# Patient Record
Sex: MALE | Race: WHITE | NOT HISPANIC OR LATINO | URBAN - METROPOLITAN AREA
[De-identification: names, ages, dates, MRNs, and addresses within clinical notes are randomized per-mention and may not be internally consistent; named-entity substitution may affect disease eponyms.]

---

## 2018-06-14 ENCOUNTER — OUTPATIENT (OUTPATIENT)
Dept: OUTPATIENT SERVICES | Facility: HOSPITAL | Age: 63
LOS: 1 days | Discharge: HOME | End: 2018-06-14

## 2018-06-14 DIAGNOSIS — E55.9 VITAMIN D DEFICIENCY, UNSPECIFIED: ICD-10-CM

## 2018-06-14 DIAGNOSIS — Z00.00 ENCOUNTER FOR GENERAL ADULT MEDICAL EXAMINATION WITHOUT ABNORMAL FINDINGS: ICD-10-CM

## 2018-06-21 PROBLEM — Z00.00 ENCOUNTER FOR PREVENTIVE HEALTH EXAMINATION: Status: ACTIVE | Noted: 2018-06-21

## 2018-06-27 ENCOUNTER — LABORATORY RESULT (OUTPATIENT)
Age: 63
End: 2018-06-27

## 2018-06-27 ENCOUNTER — OUTPATIENT (OUTPATIENT)
Dept: OUTPATIENT SERVICES | Facility: HOSPITAL | Age: 63
LOS: 1 days | Discharge: HOME | End: 2018-06-27

## 2018-06-27 ENCOUNTER — APPOINTMENT (OUTPATIENT)
Dept: UROLOGY | Facility: CLINIC | Age: 63
End: 2018-06-27
Payer: COMMERCIAL

## 2018-06-27 VITALS
HEIGHT: 70.5 IN | BODY MASS INDEX: 23.36 KG/M2 | DIASTOLIC BLOOD PRESSURE: 76 MMHG | WEIGHT: 165 LBS | SYSTOLIC BLOOD PRESSURE: 116 MMHG | HEART RATE: 75 BPM

## 2018-06-27 DIAGNOSIS — Z87.891 PERSONAL HISTORY OF NICOTINE DEPENDENCE: ICD-10-CM

## 2018-06-27 DIAGNOSIS — Z78.9 OTHER SPECIFIED HEALTH STATUS: ICD-10-CM

## 2018-06-27 DIAGNOSIS — Z82.49 FAMILY HISTORY OF ISCHEMIC HEART DISEASE AND OTHER DISEASES OF THE CIRCULATORY SYSTEM: ICD-10-CM

## 2018-06-27 PROCEDURE — 99203 OFFICE O/P NEW LOW 30 MIN: CPT

## 2018-06-27 PROCEDURE — 87210 SMEAR WET MOUNT SALINE/INK: CPT | Mod: QW

## 2018-06-27 RX ORDER — ASPIRIN ENTERIC COATED TABLETS 81 MG 81 MG/1
81 TABLET, DELAYED RELEASE ORAL
Refills: 0 | Status: ACTIVE | COMMUNITY

## 2018-06-27 NOTE — LETTER BODY
[Dear  ___] : Dear  [unfilled], [Consult Letter:] : I had the pleasure of evaluating your patient, [unfilled]. [Please see my note below.] : Please see my note below. [Consult Closing:] : Thank you very much for allowing me to participate in the care of this patient.  If you have any questions, please do not hesitate to contact me. [FreeTextEntry2] : Manohar Velazquez MD\par [address]\[place] [address]

## 2018-06-27 NOTE — LETTER HEADER
[Angelika Saha MD] : Angelika Saha MD [Director of Urology] : Director of Urology [[address]] : [address] [[address]] : [address] [[place] [address]] : [place] [address] [Tel [phone]] : Tel [phone] [Fax [phone]] : Fax [phone]

## 2018-06-27 NOTE — PHYSICAL EXAM
[General Appearance - Well Developed] : well developed [General Appearance - Well Nourished] : well nourished [Normal Appearance] : normal appearance [Well Groomed] : well groomed [General Appearance - In No Acute Distress] : no acute distress [Heart Rate And Rhythm] : Heart rate and rhythm were normal [Edema] : no peripheral edema [Respiration, Rhythm And Depth] : normal respiratory rhythm and effort [Exaggerated Use Of Accessory Muscles For Inspiration] : no accessory muscle use [Auscultation Breath Sounds / Voice Sounds] : lungs were clear to auscultation bilaterally [Abdomen Soft] : soft [Abdomen Tenderness] : non-tender [Abdomen Hernia] : no hernia was discovered [Costovertebral Angle Tenderness] : no ~M costovertebral angle tenderness [Urethral Meatus] : meatus normal [Penis Abnormality] : normal circumcised penis [Epididymis] : the epididymides were normal [Testes Tenderness] : no tenderness of the testes [Testes Mass (___cm)] : there were no testicular masses [Anus Abnormality] : the anus and perineum were normal [Rectal Exam - Rectum] : digital rectal exam was normal [Prostate Enlargement] : the prostate was not enlarged [Prostate Tenderness] : the prostate was not tender [No Prostate Nodules] : no prostate nodules [Prostate Size ___ gm] : prostate size [unfilled] gm [Normal Station and Gait] : the gait and station were normal for the patient's age [] : no rash [FreeTextEntry1] : DTR's & BC reflexes were intact  [Oriented To Time, Place, And Person] : oriented to person, place, and time [Affect] : the affect was normal [Mood] : the mood was normal [Not Anxious] : not anxious [No Palpable Adenopathy] : no palpable adenopathy

## 2018-06-27 NOTE — HISTORY OF PRESENT ILLNESS
[FreeTextEntry1] : Jose is a pleasant 63-year-old male who in November 2016 was found to have a PSA of six was told to see someone and waited till now. A repeat PSA on June 14, 2018 was 9.1. There was no free PSA drawn. He is now decided to come seek an evaluation.\par \par Associated lower urinary tract symptoms which began about a year ago include incomplete emptying – one\par Frequency – two\par Urgency – two. \par He wakes up once per night and this is irritating giving him a quality-of-life of four. When he voids he is voiding normal amounts per void so this may be behavioral i.e. he’s taking a lot of fluid something we will look into once we see what’s going on with his PSA.\par \par Prior medical conditions are fairly benign, he takes a daily aspirin and as a relatively good health. Other urologic issues his erections are not what they used to be but they suffice for him and his partner and he has no history of stones blood in the urine or any personal family history of urologic cancers.\par  [Urinary Urgency] : urinary urgency [Urinary Frequency] : urinary frequency [Nocturia] : nocturia [None] : None

## 2018-06-27 NOTE — ASSESSMENT
[FreeTextEntry1] : His exam is relatively benign except for some mild penile atrophy. The prostate is on the small side perhaps 15 g with some slight a bogginess to the left side. Prostatic massage yielded mild amounts of expressed prostatic secretions a microscopic exam of which showed about 12 to 13 white cells per high power field which is in the equivocal region.\par \par He will have him wait a few days because I just did prostatic massage and he will get blood drawn after 2 to 3 days of no orgasm. We will get a free PSA and we will also send the expressed prostatic secretions off for culture. Depending on the free PSA and the culture results we may put him on antibiotics, we may recommend an ultrasound and possibly an ultrasound guided biopsy. Depending on the numbers I might also request an MRI first though many the insurance companies are not allowing it current AUA feeling is that an MRI may rule out the need for a biopsy and given the 2 to 4% incidence of sepsis post a biopsy is probably cost-effective but for now hard to get approved\par

## 2018-06-28 DIAGNOSIS — R33.9 RETENTION OF URINE, UNSPECIFIED: ICD-10-CM

## 2018-06-28 DIAGNOSIS — R35.1 NOCTURIA: ICD-10-CM

## 2018-06-28 DIAGNOSIS — R97.20 ELEVATED PROSTATE SPECIFIC ANTIGEN [PSA]: ICD-10-CM

## 2018-06-28 DIAGNOSIS — R35.0 FREQUENCY OF MICTURITION: ICD-10-CM

## 2018-06-29 LAB
APPEARANCE: CLEAR
BILIRUBIN URINE: NEGATIVE
BLOOD URINE: NEGATIVE
COLOR: YELLOW
GLUCOSE QUALITATIVE U: NEGATIVE MG/DL
KETONES URINE: NEGATIVE
LEUKOCYTE ESTERASE URINE: NEGATIVE
NITRITE URINE: NEGATIVE
PH URINE: 6
PROTEIN URINE: NEGATIVE MG/DL
SPECIFIC GRAVITY URINE: 1.01
UROBILINOGEN URINE: 0.2 MG/DL (ref 0.2–?)

## 2018-07-02 ENCOUNTER — LABORATORY RESULT (OUTPATIENT)
Age: 63
End: 2018-07-02

## 2018-07-02 ENCOUNTER — OUTPATIENT (OUTPATIENT)
Dept: OUTPATIENT SERVICES | Facility: HOSPITAL | Age: 63
LOS: 1 days | Discharge: HOME | End: 2018-07-02

## 2018-07-02 DIAGNOSIS — R33.9 RETENTION OF URINE, UNSPECIFIED: ICD-10-CM

## 2018-07-02 DIAGNOSIS — R35.0 FREQUENCY OF MICTURITION: ICD-10-CM

## 2018-07-02 DIAGNOSIS — R97.20 ELEVATED PROSTATE SPECIFIC ANTIGEN [PSA]: ICD-10-CM

## 2018-07-02 DIAGNOSIS — R39.15 URGENCY OF URINATION: ICD-10-CM

## 2018-07-02 LAB
BACTERIA SPEC CULT: ABNORMAL
BACTERIA UR CULT: NORMAL

## 2018-07-03 LAB
PSA FREE FLD-MCNC: 10
PSA FREE SERPL-MCNC: 0.97 NG/ML
PSA SERPL-MCNC: 9.68 NG/ML

## 2018-07-05 ENCOUNTER — APPOINTMENT (OUTPATIENT)
Dept: UROLOGY | Facility: CLINIC | Age: 63
End: 2018-07-05
Payer: COMMERCIAL

## 2018-07-05 VITALS
SYSTOLIC BLOOD PRESSURE: 114 MMHG | HEIGHT: 70 IN | BODY MASS INDEX: 23.62 KG/M2 | WEIGHT: 165 LBS | HEART RATE: 74 BPM | DIASTOLIC BLOOD PRESSURE: 74 MMHG

## 2018-07-05 PROCEDURE — 99214 OFFICE O/P EST MOD 30 MIN: CPT

## 2018-07-05 NOTE — HISTORY OF PRESENT ILLNESS
[FreeTextEntry1] : Jose was seen on June 27. With respect to the urinary issues he was sent for record of his intake and output, would consider a flow study and he sent for urinalysis, culture and cytology. With respect to his PSA elevation prostatic massage as shown expressed prostatic secretions so there was a question of prostatitis. We sent that for culture. We had him wait a few days after that so he could then get a repeat PSA and is here to review the culture reports in the data. [Urinary Urgency] : urinary urgency [Urinary Frequency] : urinary frequency [Nocturia] : nocturia [None] : None

## 2018-07-05 NOTE — LETTER BODY
[Dear  ___] : Dear  [unfilled], [Courtesy Letter:] : I had the pleasure of seeing your patient, [unfilled], in my office today. [Please see my note below.] : Please see my note below. [Sincerely,] : Sincerely, [FreeTextEntry2] : Manohar Velazquez MD\par [address]\[place] [address]\par

## 2018-07-05 NOTE — ASSESSMENT
[FreeTextEntry1] : His record of his intake and output was meticulously done his intake was 52 ounces which is only a liter and a half not enough to explain why he would have trouble with urination. When we looked at his voiding I don’t see an issue here. He went once with an hour and a quarter of an interval the rest of 2 to 4 hours apart and he had no nocturia. However he never record of the volume voided so it’s not really a great study and he will have to repeat it. However, as we will discuss shortly given the findings of prostatitis I’d wait and see how that resolves before worrying about his voiding as very often the prostatitis in and of itself can cause irritation causing increased voiding.\par \par With respect to his PSA the repeat came back at 9.68 with a free PSA of 10%. Theoretically that’s not great however the culture of the Expressed Prostatic Secretions (EPS) came out growing coag negative Staphylococcus. Theoretically the free PSA should be high if the PSA was coming from infection. However, he said this elevation for over two years, by seeing him in the face of a chronic prostatitis has an increased risk of sepsis and if we treat his chronic prostatitis and the PSA comes down to normal levels, something that is unlikely but possible we might get away with a biopsy which in and of itself and healthy patients has a 2 – 3% risk of sepsis. Though we had the potential risk of delay in diagnosis in my opinion the risk-benefit of waiting to see if the PSA stays high and if we do perform a biopsy do it after his prostatitis is treated is worth risk. Please note his urine tests had a UA with a specific gravity of 1.015, pH of six and was negative for infection both on the dip and on the culture as a urine culture was no growth. His cytology had rare atypical cells which for now is something that is not high in the list of angst inducing\par \par As far as his hormone levels they are still pending\par

## 2018-07-05 NOTE — LETTER HEADER
[FreeTextEntry3] : Angelika Saha M.D.\par Director of Urology\par [facility]\par [address] [place] [address], [address]\[place] [address]

## 2018-07-17 ENCOUNTER — TRANSCRIPTION ENCOUNTER (OUTPATIENT)
Age: 63
End: 2018-07-17

## 2018-08-05 ENCOUNTER — LABORATORY RESULT (OUTPATIENT)
Age: 63
End: 2018-08-05

## 2018-08-06 ENCOUNTER — APPOINTMENT (OUTPATIENT)
Dept: UROLOGY | Facility: CLINIC | Age: 63
End: 2018-08-06
Payer: COMMERCIAL

## 2018-08-06 ENCOUNTER — LABORATORY RESULT (OUTPATIENT)
Age: 63
End: 2018-08-06

## 2018-08-06 ENCOUNTER — OUTPATIENT (OUTPATIENT)
Dept: OUTPATIENT SERVICES | Facility: HOSPITAL | Age: 63
LOS: 1 days | Discharge: HOME | End: 2018-08-06

## 2018-08-06 VITALS
HEART RATE: 93 BPM | HEIGHT: 70 IN | SYSTOLIC BLOOD PRESSURE: 122 MMHG | DIASTOLIC BLOOD PRESSURE: 77 MMHG | WEIGHT: 165 LBS | BODY MASS INDEX: 23.62 KG/M2

## 2018-08-06 DIAGNOSIS — R33.9 RETENTION OF URINE, UNSPECIFIED: ICD-10-CM

## 2018-08-06 DIAGNOSIS — Z87.898 PERSONAL HISTORY OF OTHER SPECIFIED CONDITIONS: ICD-10-CM

## 2018-08-06 DIAGNOSIS — Z87.448 PERSONAL HISTORY OF OTHER DISEASES OF URINARY SYSTEM: ICD-10-CM

## 2018-08-06 PROCEDURE — 87210 SMEAR WET MOUNT SALINE/INK: CPT | Mod: QW

## 2018-08-06 PROCEDURE — 99214 OFFICE O/P EST MOD 30 MIN: CPT

## 2018-08-06 PROCEDURE — 36415 COLL VENOUS BLD VENIPUNCTURE: CPT

## 2018-08-06 RX ORDER — SULFAMETHOXAZOLE AND TRIMETHOPRIM 400; 80 MG/1; MG/1
400-80 TABLET ORAL TWICE DAILY
Qty: 60 | Refills: 1 | Status: DISCONTINUED | COMMUNITY
Start: 2018-07-05 | End: 2018-08-06

## 2018-08-06 NOTE — LETTER BODY
[Dear  ___] : Dear  [unfilled], [Courtesy Letter:] : I had the pleasure of seeing your patient, [unfilled], in my office today. [Please see my note below.] : Please see my note below. [Sincerely,] : Sincerely, [FreeTextEntry2] : Manohar Velazquez MD\par [address]\[place] [address]\par

## 2018-08-06 NOTE — PHYSICAL EXAM
[General Appearance - Well Developed] : well developed [General Appearance - Well Nourished] : well nourished [Normal Appearance] : normal appearance [Well Groomed] : well groomed [General Appearance - In No Acute Distress] : no acute distress [Bowel Sounds] : normal bowel sounds [Abdomen Soft] : soft [Abdomen Tenderness] : non-tender [Costovertebral Angle Tenderness] : no ~M costovertebral angle tenderness [Urinary Bladder Findings] : the bladder was normal on palpation [Edema] : no peripheral edema [] : no respiratory distress [Respiration, Rhythm And Depth] : normal respiratory rhythm and effort [Exaggerated Use Of Accessory Muscles For Inspiration] : no accessory muscle use [Oriented To Time, Place, And Person] : oriented to person, place, and time [Affect] : the affect was normal [Mood] : the mood was normal [Not Anxious] : not anxious [Normal Station and Gait] : the gait and station were normal for the patient's age [No Focal Deficits] : no focal deficits [FreeTextEntry1] : Prostate was still congested though not as tender as the last time. Massage yielded copious amounts of expressed prostatic secretions

## 2018-08-06 NOTE — ASSESSMENT
[FreeTextEntry1] : We do not have a repeat PSA with him on the Bactrim so he will draw the blood today.\par \par The prostate still feels congested though it is not as tender and the symptoms have gone away said to some extent his prostatitis appears to have responded. However he still has a large amount of secretions however that may be due to the infrequent nature of sexual activity. On the negative side the microscopic exam still shows a large number of white cells.\par \par We will see with a repeat culture shows. If it is now negative we will see what the repeat PSA shows if it is still significantly elevated then it is probably appropriate to go ahead with a biopsy. Whether or not we can get an MRI approved before we do the biopsy will depend on his insurance. However, especially given the chronic prostatitis, the chance for the PSA elevation to be a false positive and the increased risk of sepsis of biasing a prostate that by definition has bacteria in it we would hope that they would allow it.\par

## 2018-08-06 NOTE — HISTORY OF PRESENT ILLNESS
[None] : no symptoms [FreeTextEntry1] : Jose Saucedo is a 63 year old male who we have been following for elevated PSA values and chronic prostatitis. At his last visit prostatic message secretions were sent for culture which reavealed coagulase negative staph. He was placed on Bactrim BID x 4 weeks. He has finished his last dose on 8/4/18. He reports a resolution of his urinary urgency post treatment [Nocturia] : no nocturia

## 2018-08-06 NOTE — LETTER HEADER
[FreeTextEntry3] : Angelika Saha M.D.\par Director of Urology\par [facility]\par [address] [place] [address], [address]\[place] [address]

## 2018-08-07 DIAGNOSIS — Z87.448 PERSONAL HISTORY OF OTHER DISEASES OF URINARY SYSTEM: ICD-10-CM

## 2018-08-07 DIAGNOSIS — R33.9 RETENTION OF URINE, UNSPECIFIED: ICD-10-CM

## 2018-08-07 DIAGNOSIS — Z87.898 PERSONAL HISTORY OF OTHER SPECIFIED CONDITIONS: ICD-10-CM

## 2018-08-07 DIAGNOSIS — R97.20 ELEVATED PROSTATE SPECIFIC ANTIGEN [PSA]: ICD-10-CM

## 2018-08-09 ENCOUNTER — RX CHANGE (OUTPATIENT)
Age: 63
End: 2018-08-09

## 2018-08-09 ENCOUNTER — RESULT REVIEW (OUTPATIENT)
Age: 63
End: 2018-08-09

## 2018-08-09 LAB — BACTERIA SPEC CULT: ABNORMAL

## 2018-09-04 ENCOUNTER — OUTPATIENT (OUTPATIENT)
Dept: OUTPATIENT SERVICES | Facility: HOSPITAL | Age: 63
LOS: 1 days | Discharge: HOME | End: 2018-09-04

## 2018-09-04 DIAGNOSIS — R97.20 ELEVATED PROSTATE SPECIFIC ANTIGEN [PSA]: ICD-10-CM

## 2018-09-16 ENCOUNTER — LABORATORY RESULT (OUTPATIENT)
Age: 63
End: 2018-09-16

## 2018-09-17 ENCOUNTER — APPOINTMENT (OUTPATIENT)
Dept: UROLOGY | Facility: CLINIC | Age: 63
End: 2018-09-17
Payer: COMMERCIAL

## 2018-09-17 ENCOUNTER — OUTPATIENT (OUTPATIENT)
Dept: OUTPATIENT SERVICES | Facility: HOSPITAL | Age: 63
LOS: 1 days | Discharge: HOME | End: 2018-09-17

## 2018-09-17 VITALS — HEIGHT: 70 IN | WEIGHT: 165 LBS | BODY MASS INDEX: 23.62 KG/M2

## 2018-09-17 VITALS — SYSTOLIC BLOOD PRESSURE: 126 MMHG | HEART RATE: 79 BPM | DIASTOLIC BLOOD PRESSURE: 83 MMHG

## 2018-09-17 LAB
PSA FREE FLD-MCNC: 9
PSA FREE SERPL-MCNC: 1.1 NG/ML
PSA SERPL-MCNC: 12.2 NG/ML

## 2018-09-17 PROCEDURE — 99214 OFFICE O/P EST MOD 30 MIN: CPT

## 2018-09-17 RX ORDER — DOXYCYCLINE 100 MG/1
100 CAPSULE ORAL TWICE DAILY
Qty: 60 | Refills: 0 | Status: DISCONTINUED | COMMUNITY
Start: 2018-08-09 | End: 2018-09-17

## 2018-09-17 NOTE — LETTER BODY
[Dear  ___] : Dear  [unfilled], [Courtesy Letter:] : I had the pleasure of seeing your patient, [unfilled], in my office today. [Please see my note below.] : Please see my note below. [Sincerely,] : Sincerely, [FreeTextEntry2] : Manohar Velazquez MD\par [address]\[place] [address]\par

## 2018-09-17 NOTE — HISTORY OF PRESENT ILLNESS
[Urinary Urgency] : urinary urgency [FreeTextEntry1] : Jose is a 63 year old male who we have been following for elevated PSA and chronic prostatitis. He has finished both Bactrim and Doxycycline for his most recent culture of Staph from EPS. He is feeling better and states that though he still has some mild urinary urgency it is minimal compared to what he had before. He is here for results of his most recent PSA testing

## 2018-09-17 NOTE — LETTER HEADER
[FreeTextEntry3] : Angelika Saha M.D.\par Director of Urology\par [facility]\par [address] [place] [address], [address]\[place] [address]

## 2018-09-17 NOTE — ASSESSMENT
[FreeTextEntry1] : Despite two months of antibiotics culture-based his PSA if anything is gone up with the last PSA on September 4 now 12.2 which is a steady rise from the 9.7 on July 2, 11.7 on August 6 and at 12.2. The free PSA is low being respectively 10, 7.5 and 9%. Though theoretically this could also be due to the inflammation, and in fact if anything his expressed prostatic secretions today was even higher, I’m getting uncomfortable sitting on this.\par \par We will submit for an MRI pre-and post gadolinium and the main reason being I don’t want to do a biopsy if it’s infection and I don’t want to sit on an infection that’s really a malignancy. In the meantime we will send off the latest expressed prostatic secretions plus his urine from before voiding upper brothers is VB 1) as well as a small amount that he will urinate out just after the massage now (VB 2) I may have the cultures backed by Friday unlikely and I will be out Monday for the Buddhism holiday. He can speak to Char who he met today and/or speak to me on Wednesday when I come back.\par

## 2018-09-17 NOTE — PHYSICAL EXAM
[General Appearance - Well Developed] : well developed [General Appearance - Well Nourished] : well nourished [Normal Appearance] : normal appearance [Well Groomed] : well groomed [General Appearance - In No Acute Distress] : no acute distress [Abdomen Soft] : soft [Abdomen Tenderness] : non-tender [Costovertebral Angle Tenderness] : no ~M costovertebral angle tenderness [Urethral Meatus] : meatus normal [Penis Abnormality] : normal circumcised penis [Urinary Bladder Findings] : the bladder was normal on palpation [Scrotum] : the scrotum was normal [Testes Mass (___cm)] : there were no testicular masses [Prostate Tenderness] : the prostate was not tender [Edema] : no peripheral edema [] : no respiratory distress [Respiration, Rhythm And Depth] : normal respiratory rhythm and effort [Exaggerated Use Of Accessory Muscles For Inspiration] : no accessory muscle use [Oriented To Time, Place, And Person] : oriented to person, place, and time [Affect] : the affect was normal [Mood] : the mood was normal [Not Anxious] : not anxious [Normal Station and Gait] : the gait and station were normal for the patient's age [No Focal Deficits] : no focal deficits [FreeTextEntry1] : EPS was obtained and WBC were noted. Prostate feels smaller with some fullness in the RIGHT lobe

## 2018-09-18 DIAGNOSIS — N41.1 CHRONIC PROSTATITIS: ICD-10-CM

## 2018-09-19 LAB
APPEARANCE: ABNORMAL
BILIRUBIN URINE: NEGATIVE
BLOOD URINE: NEGATIVE
COLOR: NORMAL
GLUCOSE QUALITATIVE U: NEGATIVE
KETONES URINE: NEGATIVE
LEUKOCYTE ESTERASE URINE: NEGATIVE
NITRITE URINE: NEGATIVE
PH URINE: 5.5
PROTEIN URINE: NEGATIVE
SPECIFIC GRAVITY URINE: >=1.03
UROBILINOGEN URINE: 0.2 (ref 0.2–?)

## 2018-09-20 LAB — BACTERIA UR CULT: ABNORMAL

## 2018-09-21 LAB — BACTERIA SPEC CULT: ABNORMAL

## 2018-10-02 ENCOUNTER — OTHER (OUTPATIENT)
Age: 63
End: 2018-10-02

## 2018-10-04 ENCOUNTER — OUTPATIENT (OUTPATIENT)
Dept: OUTPATIENT SERVICES | Facility: HOSPITAL | Age: 63
LOS: 1 days | Discharge: HOME | End: 2018-10-04

## 2018-10-04 DIAGNOSIS — N41.1 CHRONIC PROSTATITIS: ICD-10-CM

## 2018-10-04 LAB
ANION GAP SERPL CALC-SCNC: 13 MMOL/L
BUN SERPL-MCNC: 13 MG/DL
CALCIUM SERPL-MCNC: 9.1 MG/DL
CHLORIDE SERPL-SCNC: 102 MMOL/L
CO2 SERPL-SCNC: 27 MMOL/L
CREAT SERPL-MCNC: 0.9 MG/DL
GLUCOSE SERPL-MCNC: 89 MG/DL
POTASSIUM SERPL-SCNC: 4.8 MMOL/L
SODIUM SERPL-SCNC: 142 MMOL/L

## 2018-10-18 ENCOUNTER — OUTPATIENT (OUTPATIENT)
Dept: OUTPATIENT SERVICES | Facility: HOSPITAL | Age: 63
LOS: 1 days | Discharge: HOME | End: 2018-10-18

## 2018-10-18 DIAGNOSIS — K65.1 PERITONEAL ABSCESS: ICD-10-CM

## 2018-10-23 ENCOUNTER — OUTPATIENT (OUTPATIENT)
Dept: OUTPATIENT SERVICES | Facility: HOSPITAL | Age: 63
LOS: 1 days | Discharge: HOME | End: 2018-10-23

## 2018-10-23 DIAGNOSIS — D53.9 NUTRITIONAL ANEMIA, UNSPECIFIED: ICD-10-CM

## 2018-10-23 DIAGNOSIS — D51.0 VITAMIN B12 DEFICIENCY ANEMIA DUE TO INTRINSIC FACTOR DEFICIENCY: ICD-10-CM

## 2018-10-23 DIAGNOSIS — N39.0 URINARY TRACT INFECTION, SITE NOT SPECIFIED: ICD-10-CM

## 2018-10-23 DIAGNOSIS — E78.5 HYPERLIPIDEMIA, UNSPECIFIED: ICD-10-CM

## 2018-10-23 DIAGNOSIS — E11.9 TYPE 2 DIABETES MELLITUS WITHOUT COMPLICATIONS: ICD-10-CM

## 2018-10-23 DIAGNOSIS — E03.9 HYPOTHYROIDISM, UNSPECIFIED: ICD-10-CM

## 2018-10-23 DIAGNOSIS — N41.1 CHRONIC PROSTATITIS: ICD-10-CM

## 2018-10-23 DIAGNOSIS — E55.9 VITAMIN D DEFICIENCY, UNSPECIFIED: ICD-10-CM

## 2018-10-23 DIAGNOSIS — N40.1 BENIGN PROSTATIC HYPERPLASIA WITH LOWER URINARY TRACT SYMPTOMS: ICD-10-CM

## 2018-10-25 ENCOUNTER — OUTPATIENT (OUTPATIENT)
Dept: OUTPATIENT SERVICES | Facility: HOSPITAL | Age: 63
LOS: 1 days | Discharge: HOME | End: 2018-10-25

## 2018-10-25 ENCOUNTER — APPOINTMENT (OUTPATIENT)
Dept: UROLOGY | Facility: CLINIC | Age: 63
End: 2018-10-25
Payer: COMMERCIAL

## 2018-10-25 VITALS
SYSTOLIC BLOOD PRESSURE: 123 MMHG | HEART RATE: 95 BPM | DIASTOLIC BLOOD PRESSURE: 77 MMHG | HEIGHT: 70 IN | WEIGHT: 165 LBS | BODY MASS INDEX: 23.62 KG/M2

## 2018-10-25 PROCEDURE — 99214 OFFICE O/P EST MOD 30 MIN: CPT

## 2018-10-25 NOTE — ASSESSMENT
[FreeTextEntry1] : Prostatic massage still showed a boggy prostate perhaps a little bit less than in the past and he tells me a massage was not as uncomfortable as it was in the past however he still had copious amounts of expressed prostatic secretions in the microscopic exam was still loaded with white cells. I don’t know the culture yet but we did send one and we will see what that shows.\par \par With respect to the prostatic MRI, I discussed it with the radiologist real-time. That is a little more suspicious than the rest of the prostate. However the whole prostate lights up with findings consistent with prostatitis and therefore he can’t tell whether this area is just a lower degree of prostatitis or an area of carcinoma any recommended reimaging in six months. I discussed this with our cancer specialist Dr. Story who recommended getting a 4K something we arrange before he came in today. If the 4K score is suspicious then we will send him to [place] to see Dr. Stanford who does an MRI focus prostate biopsy. If the 4K score is low we will follow him over time.\par

## 2018-10-25 NOTE — PHYSICAL EXAM
[General Appearance - Well Developed] : well developed [General Appearance - Well Nourished] : well nourished [Normal Appearance] : normal appearance [Well Groomed] : well groomed [General Appearance - In No Acute Distress] : no acute distress [Bowel Sounds] : normal bowel sounds [Abdomen Soft] : soft [Abdomen Tenderness] : non-tender [Costovertebral Angle Tenderness] : no ~M costovertebral angle tenderness [Urinary Bladder Findings] : the bladder was normal on palpation [Heart Rate And Rhythm] : Heart rate and rhythm were normal [Edema] : no peripheral edema [] : no respiratory distress [Respiration, Rhythm And Depth] : normal respiratory rhythm and effort [Exaggerated Use Of Accessory Muscles For Inspiration] : no accessory muscle use [Oriented To Time, Place, And Person] : oriented to person, place, and time [Affect] : the affect was normal [Mood] : the mood was normal [Not Anxious] : not anxious [Normal Station and Gait] : the gait and station were normal for the patient's age [No Focal Deficits] : no focal deficits [FreeTextEntry1] : Prostate was still congested though not as tender as the last time. Massage yielded copious amounts of expressed prostatic secretions

## 2018-10-25 NOTE — LETTER BODY
[Dear  ___] : Dear  [unfilled], [Courtesy Letter:] : I had the pleasure of seeing your patient, [unfilled], in my office today. [Please see my note below.] : Please see my note below. [Sincerely,] : Sincerely, [FreeTextEntry2] : Manohar Velazquez MD\par [address]\[place] [address]\par

## 2018-10-25 NOTE — LETTER HEADER
[FreeTextEntry3] : Angelika Saha M.D.\par Director of Urology\par [facility]\par [address] [place] [address], [address]\[place] [address]

## 2018-10-26 DIAGNOSIS — N41.1 CHRONIC PROSTATITIS: ICD-10-CM

## 2018-10-29 ENCOUNTER — APPOINTMENT (OUTPATIENT)
Dept: UROLOGY | Facility: CLINIC | Age: 63
End: 2018-10-29

## 2018-11-01 LAB — BACTERIA SPEC CULT: ABNORMAL

## 2018-11-02 LAB
4K SCORE CALCULATION: 93 %
FREE PSA: 1.14 NG/ML
PERCENT FREE PSA: 9 %
TOTAL PSA: 12.9 NG/ML

## 2018-11-06 DIAGNOSIS — Z02.9 ENCOUNTER FOR ADMINISTRATIVE EXAMINATIONS, UNSPECIFIED: ICD-10-CM

## 2018-11-08 ENCOUNTER — APPOINTMENT (OUTPATIENT)
Dept: UROLOGY | Facility: CLINIC | Age: 63
End: 2018-11-08
Payer: COMMERCIAL

## 2018-11-08 VITALS
HEART RATE: 86 BPM | HEIGHT: 70 IN | SYSTOLIC BLOOD PRESSURE: 131 MMHG | BODY MASS INDEX: 23.62 KG/M2 | DIASTOLIC BLOOD PRESSURE: 72 MMHG | WEIGHT: 165 LBS

## 2018-11-08 PROCEDURE — 99214 OFFICE O/P EST MOD 30 MIN: CPT

## 2018-11-11 NOTE — LETTER HEADER
[FreeTextEntry3] : Angelika Saha M.D.\par Director of Urology\par [facility]\par [address] [place] [address], [address]\[place] [address]

## 2018-11-11 NOTE — PHYSICAL EXAM
[General Appearance - Well Developed] : well developed [General Appearance - Well Nourished] : well nourished [Normal Appearance] : normal appearance [Well Groomed] : well groomed [General Appearance - In No Acute Distress] : no acute distress [Edema] : no peripheral edema [] : no respiratory distress [Respiration, Rhythm And Depth] : normal respiratory rhythm and effort [Exaggerated Use Of Accessory Muscles For Inspiration] : no accessory muscle use [Oriented To Time, Place, And Person] : oriented to person, place, and time [Affect] : the affect was normal [Mood] : the mood was normal [Not Anxious] : not anxious [Normal Station and Gait] : the gait and station were normal for the patient's age [No Focal Deficits] : no focal deficits [FreeTextEntry1] : CAROLINE not done as he will be seeing Dr. Stanford soon

## 2018-11-11 NOTE — HISTORY OF PRESENT ILLNESS
[None] : None [FreeTextEntry1] : Jose is a 63-year-old male who we have been following for elevated PSA and chronic prostatitis. He has been on multiple antibiotics for his chronic prostatitis and has seen an infectious disease specialist, Dr. Reina, who put him on Zyvox. Repeat EPS showed Klebsiella, which is a new finding. Dr. Reina was consulted by phone and and she recommended treating IF we are going to a biopsy. A confounding issue, is his elevated 4K score and inconclusive MRI secondary to prostatitis. Dr. Erik Stanford has been contacted to arrange for an MRI fusion or trans perineal (lower infection rate) biopsy.

## 2018-11-11 NOTE — LETTER BODY
[Dear  ___] : Dear  [unfilled], [Courtesy Letter:] : I had the pleasure of seeing your patient, [unfilled], in my office today. [Please see my note below.] : Please see my note below. [Sincerely,] : Sincerely, [FreeTextEntry2] : Manohar Velazquez MD\par [address]\[place] [address] [DrDenice  ___] : Dr. DE LA TORRE

## 2018-11-11 NOTE — ASSESSMENT
[FreeTextEntry1] : Again, we had a thorough prolonged discussion of all the options available first with him and then almost a complete repetition at his request as he was not clear and wanted us to review with him again with his wife there.\par \par His 4K score is 93%, indicating a significantly elevated risk for aggressive prostate cancer. His MRI was inconclusive as chronic prostatitis clouds the image. Confounding issue is his continued chronic prostatitis. \par \par After a thorough discussion of all the options available he is electing to undergo a MRI fusion vs a trans perineal prostate biopsy by Dr. Stanford.\par \par He will continue Augmentin for his chronic prostatitis x30 days, and continue this through his prostate biopsy. \par \par He will follow up after for further evaluation.

## 2018-11-15 ENCOUNTER — APPOINTMENT (OUTPATIENT)
Dept: UROLOGY | Facility: CLINIC | Age: 63
End: 2018-11-15
Payer: COMMERCIAL

## 2018-11-15 PROCEDURE — 99214 OFFICE O/P EST MOD 30 MIN: CPT

## 2018-11-17 LAB — BACTERIA UR CULT: NORMAL

## 2018-12-07 ENCOUNTER — APPOINTMENT (OUTPATIENT)
Dept: UROLOGY | Facility: CLINIC | Age: 63
End: 2018-12-07
Payer: COMMERCIAL

## 2018-12-07 ENCOUNTER — OUTPATIENT (OUTPATIENT)
Dept: OUTPATIENT SERVICES | Facility: HOSPITAL | Age: 63
LOS: 1 days | End: 2018-12-07
Payer: COMMERCIAL

## 2018-12-07 VITALS
DIASTOLIC BLOOD PRESSURE: 87 MMHG | WEIGHT: 165 LBS | HEART RATE: 77 BPM | SYSTOLIC BLOOD PRESSURE: 130 MMHG | TEMPERATURE: 98.4 F | HEIGHT: 70 IN | RESPIRATION RATE: 16 BRPM | BODY MASS INDEX: 23.62 KG/M2

## 2018-12-07 DIAGNOSIS — R35.0 FREQUENCY OF MICTURITION: ICD-10-CM

## 2018-12-07 PROCEDURE — 76872 US TRANSRECTAL: CPT | Mod: 26

## 2018-12-07 PROCEDURE — 76872 US TRANSRECTAL: CPT

## 2018-12-07 PROCEDURE — 55700: CPT

## 2018-12-07 PROCEDURE — 76942 ECHO GUIDE FOR BIOPSY: CPT | Mod: 26,59

## 2018-12-07 PROCEDURE — 55700: CPT | Mod: 22

## 2018-12-07 PROCEDURE — 76942 ECHO GUIDE FOR BIOPSY: CPT | Mod: 59

## 2018-12-10 LAB — CORE LAB BIOPSY: NORMAL

## 2018-12-10 RX ORDER — AMOXICILLIN AND CLAVULANATE POTASSIUM 875; 125 MG/1; MG/1
875-125 TABLET, COATED ORAL
Qty: 20 | Refills: 0 | Status: DISCONTINUED | COMMUNITY
Start: 2018-11-02 | End: 2018-12-10

## 2018-12-10 RX ORDER — DIAZEPAM 5 MG/1
5 TABLET ORAL
Qty: 1 | Refills: 0 | Status: DISCONTINUED | COMMUNITY
Start: 2018-11-15 | End: 2018-12-10

## 2018-12-13 DIAGNOSIS — C61 MALIGNANT NEOPLASM OF PROSTATE: ICD-10-CM

## 2018-12-13 DIAGNOSIS — R97.20 ELEVATED PROSTATE SPECIFIC ANTIGEN [PSA]: ICD-10-CM

## 2018-12-19 ENCOUNTER — APPOINTMENT (OUTPATIENT)
Dept: UROLOGY | Facility: CLINIC | Age: 63
End: 2018-12-19

## 2019-01-24 ENCOUNTER — OUTPATIENT (OUTPATIENT)
Dept: OUTPATIENT SERVICES | Facility: HOSPITAL | Age: 64
LOS: 1 days | End: 2019-01-24
Payer: COMMERCIAL

## 2019-01-24 VITALS
TEMPERATURE: 99 F | WEIGHT: 173.94 LBS | RESPIRATION RATE: 16 BRPM | SYSTOLIC BLOOD PRESSURE: 146 MMHG | HEART RATE: 96 BPM | HEIGHT: 69 IN | DIASTOLIC BLOOD PRESSURE: 84 MMHG

## 2019-01-24 DIAGNOSIS — R97.20 ELEVATED PROSTATE SPECIFIC ANTIGEN [PSA]: Chronic | ICD-10-CM

## 2019-01-24 DIAGNOSIS — R97.20 ELEVATED PROSTATE SPECIFIC ANTIGEN [PSA]: ICD-10-CM

## 2019-01-24 DIAGNOSIS — R06.83 SNORING: ICD-10-CM

## 2019-01-24 DIAGNOSIS — C61 MALIGNANT NEOPLASM OF PROSTATE: ICD-10-CM

## 2019-01-24 DIAGNOSIS — R94.31 ABNORMAL ELECTROCARDIOGRAM [ECG] [EKG]: ICD-10-CM

## 2019-01-24 DIAGNOSIS — R52 PAIN, UNSPECIFIED: Chronic | ICD-10-CM

## 2019-01-24 LAB
ANION GAP SERPL CALC-SCNC: 11 MMO/L — SIGNIFICANT CHANGE UP (ref 7–14)
APPEARANCE UR: CLEAR — SIGNIFICANT CHANGE UP
BILIRUB UR-MCNC: NEGATIVE — SIGNIFICANT CHANGE UP
BLD GP AB SCN SERPL QL: NEGATIVE — SIGNIFICANT CHANGE UP
BLOOD UR QL VISUAL: NEGATIVE — SIGNIFICANT CHANGE UP
BUN SERPL-MCNC: 17 MG/DL — SIGNIFICANT CHANGE UP (ref 7–23)
CALCIUM SERPL-MCNC: 9.5 MG/DL — SIGNIFICANT CHANGE UP (ref 8.4–10.5)
CHLORIDE SERPL-SCNC: 102 MMOL/L — SIGNIFICANT CHANGE UP (ref 98–107)
CO2 SERPL-SCNC: 28 MMOL/L — SIGNIFICANT CHANGE UP (ref 22–31)
COLOR SPEC: SIGNIFICANT CHANGE UP
CREAT SERPL-MCNC: 1.11 MG/DL — SIGNIFICANT CHANGE UP (ref 0.5–1.3)
GLUCOSE SERPL-MCNC: 97 MG/DL — SIGNIFICANT CHANGE UP (ref 70–99)
GLUCOSE UR-MCNC: NEGATIVE — SIGNIFICANT CHANGE UP
HCT VFR BLD CALC: 43 % — SIGNIFICANT CHANGE UP (ref 39–50)
HGB BLD-MCNC: 14.2 G/DL — SIGNIFICANT CHANGE UP (ref 13–17)
KETONES UR-MCNC: NEGATIVE — SIGNIFICANT CHANGE UP
LEUKOCYTE ESTERASE UR-ACNC: NEGATIVE — SIGNIFICANT CHANGE UP
MCHC RBC-ENTMCNC: 29.9 PG — SIGNIFICANT CHANGE UP (ref 27–34)
MCHC RBC-ENTMCNC: 33 % — SIGNIFICANT CHANGE UP (ref 32–36)
MCV RBC AUTO: 90.5 FL — SIGNIFICANT CHANGE UP (ref 80–100)
NITRITE UR-MCNC: NEGATIVE — SIGNIFICANT CHANGE UP
NRBC # FLD: 0 K/UL — LOW (ref 25–125)
PH UR: 6 — SIGNIFICANT CHANGE UP (ref 5–8)
PLATELET # BLD AUTO: 435 K/UL — HIGH (ref 150–400)
PMV BLD: 9.5 FL — SIGNIFICANT CHANGE UP (ref 7–13)
POTASSIUM SERPL-MCNC: 4.2 MMOL/L — SIGNIFICANT CHANGE UP (ref 3.5–5.3)
POTASSIUM SERPL-SCNC: 4.2 MMOL/L — SIGNIFICANT CHANGE UP (ref 3.5–5.3)
PROT UR-MCNC: 10 — SIGNIFICANT CHANGE UP
RBC # BLD: 4.75 M/UL — SIGNIFICANT CHANGE UP (ref 4.2–5.8)
RBC # FLD: 13.1 % — SIGNIFICANT CHANGE UP (ref 10.3–14.5)
RH IG SCN BLD-IMP: POSITIVE — SIGNIFICANT CHANGE UP
SODIUM SERPL-SCNC: 141 MMOL/L — SIGNIFICANT CHANGE UP (ref 135–145)
SP GR SPEC: 1.02 — SIGNIFICANT CHANGE UP (ref 1–1.04)
UROBILINOGEN FLD QL: NORMAL — SIGNIFICANT CHANGE UP
WBC # BLD: 6.91 K/UL — SIGNIFICANT CHANGE UP (ref 3.8–10.5)
WBC # FLD AUTO: 6.91 K/UL — SIGNIFICANT CHANGE UP (ref 3.8–10.5)

## 2019-01-24 PROCEDURE — 93010 ELECTROCARDIOGRAM REPORT: CPT

## 2019-01-24 NOTE — H&P PST ADULT - PROBLEM SELECTOR PLAN 1
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

## 2019-01-24 NOTE — H&P PST ADULT - PRIMARY CARE PROVIDER
pcp, Dr. Manohar Velazquez [phone]; cardiologist, Dr. Mckeon [phone]  address: [address] [place] [address], [address], [place] [address]

## 2019-01-24 NOTE — H&P PST ADULT - HISTORY OF PRESENT ILLNESS
This is a  63 male who presents with recent elevation of PSA. Patient had culture done of the prostate with subsequent antibiotic therapy due to prostatitis. He required 4 separate bouts of antibiotics with involvement of infectious disease at [facility] in  [place]. Eventual MRI confirmed pathology with peritoneal biopsy (2 of 12 sites) with questionable findings. Further intervention recommended. 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 Patient also had culture done of the prostate with subsequent antibiotic therapy due to prostatitis. He required 4 separate bouts of antibiotics with involvement of infectious disease at [facility] in [place]. Eventual MRI confirmed pathology with peritoneal biopsy (2 of 12 sites) with questionable findings. Further intervention recommended. Scheduled for robotic assisted radical prostatectomy, pelvic lymph node dissection on 2-6-19

## 2019-01-24 NOTE — H&P PST ADULT - PMH
Elevated PSA    Prostatitis  diagnosed in approximately June 2018 -- received 4 bouts of antibiotics with involvement by infectious disease from [facility] in [place]  Snoring  DELFINO precautions -- responds affirmatively to STOP BANG questionnaire

## 2019-01-24 NOTE — H&P PST ADULT - PROBLEM SELECTOR PLAN 2
Await cardiac evaluation with cardiologist due to abnormal ekg (phoned pcp who DOES NOT have old ekg for comparison  * Need to notify surgeon of pre op cardiac evaluation Await cardiac evaluation with cardiologist due to abnormal ekg (phoned pcp who DOES NOT have old ekg for comparison  * Need to notify surgeon of pre op cardiac evaluation--notified Christian in surgeon's office

## 2019-01-24 NOTE — H&P PST ADULT - FAMILY HISTORY
Father  Still living? Yes, Estimated age: Age Unknown  Family history of myocardial infarction, Age at diagnosis: Age Unknown

## 2019-01-24 NOTE — H&P PST ADULT - NS PRO ABUSE SCREEN AFRAID ANYONE YN
Diabetic pt here for Toenail trimming and foot care due to disease of the nail.    Follow up also on the right foot ulcer in the arch area.   Previous Assessment:  Onychomycosis  Pre-ulcerative calluses  Diabetic polyneuropathy associated with type 2 diabetes mellitus (CMS/HCC)  Comments:  saw PCP 11/16/17  Charcot foot due to diabetes mellitus (CMS/HCC)  Pain in both feet    Patient denies any other issues at this time.    Denies known Latex allergy or symptoms of Latex sensitivity.  Medications verified, no changes.  Allergies verified, no changes.   Tobacco use verified, no changes.   PCP verified, no changes.     Last saw PCP on 2/1/18     no

## 2019-01-25 LAB — SPECIMEN SOURCE: SIGNIFICANT CHANGE UP

## 2019-01-26 LAB — BACTERIA UR CULT: SIGNIFICANT CHANGE UP

## 2019-02-05 ENCOUNTER — TRANSCRIPTION ENCOUNTER (OUTPATIENT)
Age: 64
End: 2019-02-05

## 2019-02-05 NOTE — ASU PATIENT PROFILE, ADULT - ABILITY TO HEAR (WITH HEARING AID OR HEARING APPLIANCE IF NORMALLY USED):
Adequate: hears normal conversation without difficulty Complex Repair And Ftsg Text: The defect edges were debeveled with a #15 scalpel blade.  The primary defect was closed partially with a complex linear closure.  Given the location of the defect, shape of the defect and the proximity to free margins a full thickness skin graft was deemed most appropriate to repair the remaining defect.  The graft was trimmed to fit the size of the remaining defect.  The graft was then placed in the primary defect, oriented appropriately, and sutured into place.

## 2019-02-05 NOTE — ASU PATIENT PROFILE, ADULT - PMH
Elevated PSA    Prostatitis  diagnosed in approximately June 2018 -- received 4 bouts of antibiotics with involvement by infectious disease from [facility] in [place]  Snoring  DELFINO precautions -- responds affirmatively to STOP BANG questionnaire

## 2019-02-06 ENCOUNTER — INPATIENT (INPATIENT)
Facility: HOSPITAL | Age: 64
LOS: 0 days | Discharge: HOME | End: 2019-02-07
Attending: UROLOGY | Admitting: UROLOGY
Payer: COMMERCIAL

## 2019-02-06 ENCOUNTER — APPOINTMENT (OUTPATIENT)
Dept: UROLOGY | Facility: HOSPITAL | Age: 64
End: 2019-02-06

## 2019-02-06 ENCOUNTER — RESULT REVIEW (OUTPATIENT)
Age: 64
End: 2019-02-06

## 2019-02-06 VITALS
DIASTOLIC BLOOD PRESSURE: 81 MMHG | OXYGEN SATURATION: 100 % | RESPIRATION RATE: 16 BRPM | HEART RATE: 72 BPM | SYSTOLIC BLOOD PRESSURE: 146 MMHG | TEMPERATURE: 98 F | HEIGHT: 69 IN | WEIGHT: 173.94 LBS

## 2019-02-06 DIAGNOSIS — R97.20 ELEVATED PROSTATE SPECIFIC ANTIGEN [PSA]: Chronic | ICD-10-CM

## 2019-02-06 DIAGNOSIS — R52 PAIN, UNSPECIFIED: Chronic | ICD-10-CM

## 2019-02-06 DIAGNOSIS — C61 MALIGNANT NEOPLASM OF PROSTATE: ICD-10-CM

## 2019-02-06 LAB — RH IG SCN BLD-IMP: POSITIVE — SIGNIFICANT CHANGE UP

## 2019-02-06 PROCEDURE — 55866 LAPS SURG PRST8ECT RPBIC RAD: CPT

## 2019-02-06 PROCEDURE — 88307 TISSUE EXAM BY PATHOLOGIST: CPT | Mod: 26

## 2019-02-06 PROCEDURE — 88309 TISSUE EXAM BY PATHOLOGIST: CPT | Mod: 26

## 2019-02-06 PROCEDURE — 38571 LAPAROSCOPY LYMPHADENECTOMY: CPT

## 2019-02-06 PROCEDURE — 88342 IMHCHEM/IMCYTCHM 1ST ANTB: CPT | Mod: 26

## 2019-02-06 RX ORDER — ACETAMINOPHEN 500 MG
1000 TABLET ORAL ONCE
Qty: 0 | Refills: 0 | Status: DISCONTINUED | OUTPATIENT
Start: 2019-02-07 | End: 2019-02-07

## 2019-02-06 RX ORDER — DOCUSATE SODIUM 100 MG
100 CAPSULE ORAL THREE TIMES A DAY
Qty: 0 | Refills: 0 | Status: DISCONTINUED | OUTPATIENT
Start: 2019-02-06 | End: 2019-02-07

## 2019-02-06 RX ORDER — TRAMADOL HYDROCHLORIDE 50 MG/1
50 TABLET ORAL
Qty: 0 | Refills: 0 | Status: DISCONTINUED | OUTPATIENT
Start: 2019-02-06 | End: 2019-02-07

## 2019-02-06 RX ORDER — ASPIRIN/CALCIUM CARB/MAGNESIUM 324 MG
81 TABLET ORAL DAILY
Qty: 0 | Refills: 0 | Status: DISCONTINUED | OUTPATIENT
Start: 2019-02-06 | End: 2019-02-07

## 2019-02-06 RX ORDER — ACETAMINOPHEN 500 MG
1000 TABLET ORAL ONCE
Qty: 0 | Refills: 0 | Status: COMPLETED | OUTPATIENT
Start: 2019-02-07 | End: 2019-02-07

## 2019-02-06 RX ORDER — ONDANSETRON 8 MG/1
4 TABLET, FILM COATED ORAL EVERY 6 HOURS
Qty: 0 | Refills: 0 | Status: DISCONTINUED | OUTPATIENT
Start: 2019-02-06 | End: 2019-02-07

## 2019-02-06 RX ORDER — KETOROLAC TROMETHAMINE 30 MG/ML
15 SYRINGE (ML) INJECTION EVERY 6 HOURS
Qty: 0 | Refills: 0 | Status: DISCONTINUED | OUTPATIENT
Start: 2019-02-06 | End: 2019-02-07

## 2019-02-06 RX ORDER — ACETAMINOPHEN 500 MG
1000 TABLET ORAL ONCE
Qty: 0 | Refills: 0 | Status: COMPLETED | OUTPATIENT
Start: 2019-02-06 | End: 2019-02-06

## 2019-02-06 RX ORDER — HYDROMORPHONE HYDROCHLORIDE 2 MG/ML
1 INJECTION INTRAMUSCULAR; INTRAVENOUS; SUBCUTANEOUS
Qty: 0 | Refills: 0 | Status: DISCONTINUED | OUTPATIENT
Start: 2019-02-06 | End: 2019-02-06

## 2019-02-06 RX ORDER — OXYCODONE HYDROCHLORIDE 5 MG/1
10 TABLET ORAL ONCE
Qty: 0 | Refills: 0 | Status: DISCONTINUED | OUTPATIENT
Start: 2019-02-06 | End: 2019-02-06

## 2019-02-06 RX ORDER — LIDOCAINE 4 G/100G
1 CREAM TOPICAL
Qty: 0 | Refills: 0 | Status: DISCONTINUED | OUTPATIENT
Start: 2019-02-06 | End: 2019-02-07

## 2019-02-06 RX ORDER — HYDROCORTISONE 1 %
1 OINTMENT (GRAM) TOPICAL
Qty: 0 | Refills: 0 | Status: DISCONTINUED | OUTPATIENT
Start: 2019-02-06 | End: 2019-02-07

## 2019-02-06 RX ORDER — HYDROMORPHONE HYDROCHLORIDE 2 MG/ML
0.5 INJECTION INTRAMUSCULAR; INTRAVENOUS; SUBCUTANEOUS
Qty: 0 | Refills: 0 | Status: DISCONTINUED | OUTPATIENT
Start: 2019-02-06 | End: 2019-02-06

## 2019-02-06 RX ORDER — TRAMADOL HYDROCHLORIDE 50 MG/1
25 TABLET ORAL
Qty: 0 | Refills: 0 | Status: DISCONTINUED | OUTPATIENT
Start: 2019-02-06 | End: 2019-02-07

## 2019-02-06 RX ORDER — ASPIRIN/CALCIUM CARB/MAGNESIUM 324 MG
1 TABLET ORAL
Qty: 0 | Refills: 0 | COMMUNITY

## 2019-02-06 RX ORDER — SENNA PLUS 8.6 MG/1
2 TABLET ORAL AT BEDTIME
Qty: 0 | Refills: 0 | Status: DISCONTINUED | OUTPATIENT
Start: 2019-02-06 | End: 2019-02-07

## 2019-02-06 RX ORDER — METOCLOPRAMIDE HCL 10 MG
10 TABLET ORAL EVERY 6 HOURS
Qty: 0 | Refills: 0 | Status: DISCONTINUED | OUTPATIENT
Start: 2019-02-06 | End: 2019-02-07

## 2019-02-06 RX ORDER — HEPARIN SODIUM 5000 [USP'U]/ML
5000 INJECTION INTRAVENOUS; SUBCUTANEOUS EVERY 8 HOURS
Qty: 0 | Refills: 0 | Status: DISCONTINUED | OUTPATIENT
Start: 2019-02-06 | End: 2019-02-07

## 2019-02-06 RX ORDER — ONDANSETRON 8 MG/1
4 TABLET, FILM COATED ORAL ONCE
Qty: 0 | Refills: 0 | Status: DISCONTINUED | OUTPATIENT
Start: 2019-02-06 | End: 2019-02-06

## 2019-02-06 RX ORDER — SODIUM CHLORIDE 9 MG/ML
1000 INJECTION, SOLUTION INTRAVENOUS
Qty: 0 | Refills: 0 | Status: DISCONTINUED | OUTPATIENT
Start: 2019-02-06 | End: 2019-02-07

## 2019-02-06 RX ADMIN — Medication 100 MILLIGRAM(S): at 21:29

## 2019-02-06 RX ADMIN — HEPARIN SODIUM 5000 UNIT(S): 5000 INJECTION INTRAVENOUS; SUBCUTANEOUS at 21:29

## 2019-02-06 RX ADMIN — Medication 400 MILLIGRAM(S): at 21:29

## 2019-02-06 RX ADMIN — Medication 81 MILLIGRAM(S): at 19:29

## 2019-02-06 RX ADMIN — Medication 15 MILLIGRAM(S): at 19:29

## 2019-02-06 RX ADMIN — SODIUM CHLORIDE 125 MILLILITER(S): 9 INJECTION, SOLUTION INTRAVENOUS at 19:24

## 2019-02-06 RX ADMIN — SENNA PLUS 2 TABLET(S): 8.6 TABLET ORAL at 21:29

## 2019-02-06 NOTE — PROGRESS NOTE ADULT - SUBJECTIVE AND OBJECTIVE BOX
Note    Post op Check    s/p RALP and bilateral lymph node dissection.    Pt seen and examined reports pain at incision sites only, denies nausea.    Vital Signs Last 24 Hrs  T(C): 36.4 (06 Feb 2019 19:21), Max: 36.8 (06 Feb 2019 18:00)  T(F): 97.6 (06 Feb 2019 19:21), Max: 98.2 (06 Feb 2019 18:00)  HR: 88 (06 Feb 2019 19:21) (55 - 88)  BP: 127/60 (06 Feb 2019 19:21) (107/62 - 146/81)  BP(mean): 64 (06 Feb 2019 18:00) (64 - 80)  RR: 16 (06 Feb 2019 19:21) (8 - 16)  SpO2: 99% (06 Feb 2019 19:21) (99% - 100%)    I&O's Summary  Washington: pink 115  BERTO serosanguinous:     PHYSICAL EXAM:   Constitutional: well appearing, eating jello, A+Ox3, no acute distress    Respiratory: clear    Cardiovascular: regular    Gastrointestinal: distended, tender at surgical sites, dressing in place with minimal drainage    Genitourinary: BERTO in place draining well, serosanguinous, washington in place, draining well, pink.     Extremities: venodynes in place, no appreciable edema or pain.     Labs:  Note    Post op Check    s/p RALP and bilateral lymph node dissection.    Pt seen and examined reports pain at incision sites only, denies nausea.    Vital Signs Last 24 Hrs  T(C): 36.4 (06 Feb 2019 19:21), Max: 36.8 (06 Feb 2019 18:00)  T(F): 97.6 (06 Feb 2019 19:21), Max: 98.2 (06 Feb 2019 18:00)  HR: 88 (06 Feb 2019 19:21) (55 - 88)  BP: 127/60 (06 Feb 2019 19:21) (107/62 - 146/81)  BP(mean): 64 (06 Feb 2019 18:00) (64 - 80)  RR: 16 (06 Feb 2019 19:21) (8 - 16)  SpO2: 99% (06 Feb 2019 19:21) (99% - 100%)    I&O's Summary  Washington: pink 115  BERTO serosanguinous:     PHYSICAL EXAM:   Constitutional: well appearing, eating jello, A+Ox3, no acute distress    Respiratory: clear    Cardiovascular: regular    Gastrointestinal: distended, tender at surgical sites, dressing in place with minimal drainage    Genitourinary: BERTO in place draining well, serosanguinous, washington in place, draining well, pink.     Extremities: venodynes in place, no appreciable edema or pain.

## 2019-02-06 NOTE — PROGRESS NOTE ADULT - PROBLEM SELECTOR PLAN 1
Strict I&O's  Analgesia prn  Antiemetics prn  DVT prophylaxis  Incentive spirometry  Clears   OOB  AM labs

## 2019-02-06 NOTE — BRIEF OPERATIVE NOTE - PROCEDURE
<<-----Click on this checkbox to enter Procedure Prostatectomy, radical, robotic  02/06/2019    Active  KENNETH

## 2019-02-07 ENCOUNTER — TRANSCRIPTION ENCOUNTER (OUTPATIENT)
Age: 64
End: 2019-02-07

## 2019-02-07 VITALS
RESPIRATION RATE: 16 BRPM | DIASTOLIC BLOOD PRESSURE: 68 MMHG | OXYGEN SATURATION: 99 % | HEART RATE: 72 BPM | TEMPERATURE: 97 F | SYSTOLIC BLOOD PRESSURE: 120 MMHG

## 2019-02-07 DIAGNOSIS — C61 MALIGNANT NEOPLASM OF PROSTATE: ICD-10-CM

## 2019-02-07 DIAGNOSIS — D62 ACUTE POSTHEMORRHAGIC ANEMIA: ICD-10-CM

## 2019-02-07 LAB
ANION GAP SERPL CALC-SCNC: 15 MMO/L — HIGH (ref 7–14)
BASOPHILS # BLD AUTO: 0.05 K/UL — SIGNIFICANT CHANGE UP (ref 0–0.2)
BASOPHILS NFR BLD AUTO: 0.5 % — SIGNIFICANT CHANGE UP (ref 0–2)
BUN SERPL-MCNC: 13 MG/DL — SIGNIFICANT CHANGE UP (ref 7–23)
CALCIUM SERPL-MCNC: 8.6 MG/DL — SIGNIFICANT CHANGE UP (ref 8.4–10.5)
CHLORIDE SERPL-SCNC: 103 MMOL/L — SIGNIFICANT CHANGE UP (ref 98–107)
CO2 SERPL-SCNC: 22 MMOL/L — SIGNIFICANT CHANGE UP (ref 22–31)
CREAT FLD-MCNC: 1.03 MG/DL — SIGNIFICANT CHANGE UP
CREAT SERPL-MCNC: 1.02 MG/DL — SIGNIFICANT CHANGE UP (ref 0.5–1.3)
EOSINOPHIL # BLD AUTO: 0.06 K/UL — SIGNIFICANT CHANGE UP (ref 0–0.5)
EOSINOPHIL NFR BLD AUTO: 0.6 % — SIGNIFICANT CHANGE UP (ref 0–6)
GLUCOSE SERPL-MCNC: 106 MG/DL — HIGH (ref 70–99)
HCT VFR BLD CALC: 38.6 % — LOW (ref 39–50)
HGB BLD-MCNC: 12.1 G/DL — LOW (ref 13–17)
IMM GRANULOCYTES NFR BLD AUTO: 0.3 % — SIGNIFICANT CHANGE UP (ref 0–1.5)
LYMPHOCYTES # BLD AUTO: 2.67 K/UL — SIGNIFICANT CHANGE UP (ref 1–3.3)
LYMPHOCYTES # BLD AUTO: 27.4 % — SIGNIFICANT CHANGE UP (ref 13–44)
MCHC RBC-ENTMCNC: 29.4 PG — SIGNIFICANT CHANGE UP (ref 27–34)
MCHC RBC-ENTMCNC: 31.3 % — LOW (ref 32–36)
MCV RBC AUTO: 93.9 FL — SIGNIFICANT CHANGE UP (ref 80–100)
MONOCYTES # BLD AUTO: 0.97 K/UL — HIGH (ref 0–0.9)
MONOCYTES NFR BLD AUTO: 10 % — SIGNIFICANT CHANGE UP (ref 2–14)
NEUTROPHILS # BLD AUTO: 5.96 K/UL — SIGNIFICANT CHANGE UP (ref 1.8–7.4)
NEUTROPHILS NFR BLD AUTO: 61.2 % — SIGNIFICANT CHANGE UP (ref 43–77)
NRBC # FLD: 0 K/UL — LOW (ref 25–125)
PLATELET # BLD AUTO: 238 K/UL — SIGNIFICANT CHANGE UP (ref 150–400)
PMV BLD: 10.8 FL — SIGNIFICANT CHANGE UP (ref 7–13)
POTASSIUM SERPL-MCNC: 4 MMOL/L — SIGNIFICANT CHANGE UP (ref 3.5–5.3)
POTASSIUM SERPL-SCNC: 4 MMOL/L — SIGNIFICANT CHANGE UP (ref 3.5–5.3)
RBC # BLD: 4.11 M/UL — LOW (ref 4.2–5.8)
RBC # FLD: 13 % — SIGNIFICANT CHANGE UP (ref 10.3–14.5)
SODIUM SERPL-SCNC: 140 MMOL/L — SIGNIFICANT CHANGE UP (ref 135–145)
WBC # BLD: 9.74 K/UL — SIGNIFICANT CHANGE UP (ref 3.8–10.5)
WBC # FLD AUTO: 9.74 K/UL — SIGNIFICANT CHANGE UP (ref 3.8–10.5)

## 2019-02-07 PROCEDURE — 99223 1ST HOSP IP/OBS HIGH 75: CPT

## 2019-02-07 RX ORDER — SENNA PLUS 8.6 MG/1
2 TABLET ORAL
Qty: 0 | Refills: 0 | COMMUNITY
Start: 2019-02-07

## 2019-02-07 RX ORDER — IBUPROFEN 200 MG
1 TABLET ORAL
Qty: 20 | Refills: 0 | OUTPATIENT
Start: 2019-02-07 | End: 2019-02-11

## 2019-02-07 RX ORDER — TRAMADOL HYDROCHLORIDE 50 MG/1
0.5 TABLET ORAL
Qty: 8 | Refills: 0 | OUTPATIENT
Start: 2019-02-07

## 2019-02-07 RX ORDER — SODIUM CHLORIDE 9 MG/ML
1000 INJECTION, SOLUTION INTRAVENOUS
Qty: 0 | Refills: 0 | Status: DISCONTINUED | OUTPATIENT
Start: 2019-02-07 | End: 2019-02-07

## 2019-02-07 RX ORDER — LIDOCAINE 4 G/100G
1 CREAM TOPICAL
Qty: 15 | Refills: 0 | OUTPATIENT
Start: 2019-02-07

## 2019-02-07 RX ORDER — OMEGA-3 ACID ETHYL ESTERS 1 G
1 CAPSULE ORAL
Qty: 0 | Refills: 0 | COMMUNITY

## 2019-02-07 RX ORDER — HYDROCORTISONE 1 %
1 OINTMENT (GRAM) TOPICAL
Qty: 15 | Refills: 0 | OUTPATIENT
Start: 2019-02-07

## 2019-02-07 RX ORDER — DOCUSATE SODIUM 100 MG
1 CAPSULE ORAL
Qty: 0 | Refills: 0 | COMMUNITY
Start: 2019-02-07

## 2019-02-07 RX ADMIN — Medication 15 MILLIGRAM(S): at 09:45

## 2019-02-07 RX ADMIN — Medication 15 MILLIGRAM(S): at 09:30

## 2019-02-07 RX ADMIN — Medication 400 MILLIGRAM(S): at 11:48

## 2019-02-07 RX ADMIN — Medication 15 MILLIGRAM(S): at 11:48

## 2019-02-07 RX ADMIN — HEPARIN SODIUM 5000 UNIT(S): 5000 INJECTION INTRAVENOUS; SUBCUTANEOUS at 05:27

## 2019-02-07 RX ADMIN — Medication 100 MILLIGRAM(S): at 14:11

## 2019-02-07 RX ADMIN — Medication 15 MILLIGRAM(S): at 03:36

## 2019-02-07 RX ADMIN — Medication 100 MILLIGRAM(S): at 05:27

## 2019-02-07 RX ADMIN — Medication 81 MILLIGRAM(S): at 11:48

## 2019-02-07 RX ADMIN — SODIUM CHLORIDE 75 MILLILITER(S): 9 INJECTION, SOLUTION INTRAVENOUS at 08:26

## 2019-02-07 RX ADMIN — Medication 400 MILLIGRAM(S): at 05:27

## 2019-02-07 NOTE — CONSULT NOTE ADULT - ASSESSMENT
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

## 2019-02-07 NOTE — CONSULT NOTE ADULT - PROBLEM SELECTOR RECOMMENDATION 9
-s/p robotic assisted radical prostatectomy 2/6  -postop management per , progressing well postop, passed flatus, advance diet, pain control, IVF

## 2019-02-07 NOTE — DISCHARGE NOTE ADULT - PATIENT PORTAL LINK FT
You can access the [facility] Patient Portal, offered by [facility], by registering with the following website: [email or web address]

## 2019-02-07 NOTE — CONSULT NOTE ADULT - PROBLEM SELECTOR RECOMMENDATION 2
-preop EKG NSR, left axis deviation, LAFB  -may have been d/t lead misplacement, as pt reports repeat EKG at his cardiologist office is completely normal, no h/o cardiac disease  -takes ASA for prevention, reports family hx of CAD

## 2019-02-07 NOTE — DISCHARGE NOTE ADULT - INSTRUCTIONS
Keep well hydrated Notify Dr Stanford if you experience any increase in pain not relieved with pain medication, any redness, drainage or swelling around incisions, any nausea vomiting, fever >100.5 or any bright red blood, clots or no urine in catheter.  Drink plenty of fluids.  No heavy lifting or straining.  Use leg bag during the day and large bedside bag at night.  Kepp bag below th level of your bladder.  washington care as instructed.  Use over the counter stool softeners to assist with constipation which can be a side effect of your pain medication.

## 2019-02-07 NOTE — CONSULT NOTE ADULT - SUBJECTIVE AND OBJECTIVE BOX
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 Patient seen on POD1, feels well, ambulating on the floor, passed flatus, pain controlled, eager to advance his diet and drink coffee.  Denies h/o cardiopulmonary disease. He states that his preop EKG showed LAFB, but his cardiologist repeated EKG and it was completely normal.       PAST MEDICAL & SURGICAL HISTORY:  Snoring: DELFINO precautions -- responds affirmatively to STOP BANG questionnaire  Elevated PSA  Prostatitis: diagnosed in approximately June 2018 -- received 4 bouts of antibiotics with involvement by infectious disease from [facility] in [place]  Pain: colonoscopy -- negative findings  Elevated PSA: prostate biopsy in 2018      Review of Systems:   CONSTITUTIONAL: No fever, weight loss, or fatigue  EYES: No eye pain, visual disturbances, or discharge  ENMT:  No difficulty hearing, tinnitus, vertigo; No sinus or throat pain  NECK: No pain or stiffness  BREASTS: No pain, masses, or nipple discharge  RESPIRATORY: No cough, wheezing, chills or hemoptysis; No shortness of breath  CARDIOVASCULAR: No chest pain, palpitations, dizziness, or leg swelling  GASTROINTESTINAL: No abdominal or epigastric pain. No nausea, vomiting, or hematemesis; No diarrhea or constipation. No melena or hematochezia.  GENITOURINARY: No dysuria, frequency, hematuria, or incontinence  NEUROLOGICAL: No headaches, memory loss, loss of strength, numbness, or tremors  SKIN: No itching, burning, rashes, or lesions   LYMPH NODES: No enlarged glands  ENDOCRINE: No heat or cold intolerance; No hair loss  MUSCULOSKELETAL: No joint pain or swelling; No muscle, back, or extremity pain  PSYCHIATRIC: No depression, anxiety, mood swings, or difficulty sleeping  HEME/LYMPH: No easy bruising, or bleeding gums  ALLERY AND IMMUNOLOGIC: No hives or eczema    Allergies    No Known Allergies    Intolerances    Social History:  former smoker, quit 30 years ago    FAMILY HISTORY:  Family history of myocardial infarction (Father)      Home Medications:  aspirin 81 mg oral tablet: 1 tab(s) orally once a day - last dose 12/4/18 (06 Feb 2019 10:47)  docusate sodium 100 mg oral capsule: 1 cap(s) orally 3 times a day (07 Feb 2019 11:20)  senna oral tablet: 2 tab(s) orally once a day (at bedtime) (07 Feb 2019 11:20)      MEDICATIONS  (STANDING):  acetaminophen  IVPB .. 1000 milliGRAM(s) IV Intermittent once  acetaminophen  IVPB .. 1000 milliGRAM(s) IV Intermittent once  acetaminophen  IVPB .. 1000 milliGRAM(s) IV Intermittent once  aspirin  chewable 81 milliGRAM(s) Oral daily  docusate sodium 100 milliGRAM(s) Oral three times a day  heparin  Injectable 5000 Unit(s) SubCutaneous every 8 hours  ketorolac   Injectable 15 milliGRAM(s) IV Push every 6 hours  lidocaine 5% Ointment 1 Application(s) Topical every 3 hours  senna 2 Tablet(s) Oral at bedtime    MEDICATIONS  (PRN):  hydrocortisone 1% Ointment 1 Application(s) Topical every 3 hours PRN Washington irritation  metoclopramide Injectable 10 milliGRAM(s) IV Push every 6 hours PRN Nausea and/or Vomiting  ondansetron Injectable 4 milliGRAM(s) IV Push every 6 hours PRN Nausea and/or Vomiting  traMADol 25 milliGRAM(s) Oral every 3 hours PRN Moderate Pain (4 - 6)  traMADol 50 milliGRAM(s) Oral every 3 hours PRN Severe Pain (7 - 10)      Vital Signs Last 24 Hrs  T(C): 36.3 (07 Feb 2019 09:22), Max: 36.8 (06 Feb 2019 18:00)  T(F): 97.4 (07 Feb 2019 09:22), Max: 98.2 (06 Feb 2019 18:00)  HR: 72 (07 Feb 2019 09:22) (55 - 88)  BP: 120/68 (07 Feb 2019 09:22) (104/51 - 130/72)  BP(mean): 64 (06 Feb 2019 18:00) (64 - 80)  RR: 16 (07 Feb 2019 09:22) (8 - 17)  SpO2: 99% (07 Feb 2019 09:22) (99% - 100%)  CAPILLARY BLOOD GLUCOSE        I&O's Summary    06 Feb 2019 07:01  -  07 Feb 2019 07:00  --------------------------------------------------------  IN: 405 mL / OUT: 1445 mL / NET: -1040 mL    07 Feb 2019 07:01  -  07 Feb 2019 11:38  --------------------------------------------------------  IN: 0 mL / OUT: 1075 mL / NET: -1075 mL        PHYSICAL EXAM:  GENERAL: NAD, well-developed  HEAD:  Atraumatic, Normocephalic  EYES: EOMI, PERRLA, conjunctiva and sclera clear  NECK: Supple, No JVD  CHEST/LUNG: Clear to auscultation bilaterally; No wheeze  HEART: Regular rate and rhythm; No murmurs, rubs, or gallops  ABDOMEN: Soft, Nontender, Nondistended; Bowel sounds present  : washington  EXTREMITIES:  2+ Peripheral Pulses, No clubbing, cyanosis, or edema  PSYCH: AAOx3  NEUROLOGY: non-focal  SKIN: No rashes or lesions    LABS:                        12.1   9.74  )-----------( 238      ( 07 Feb 2019 06:00 )             38.6     02-07    140  |  103  |  13  ----------------------------<  106<H>  4.0   |  22  |  1.02    Ca    8.6      07 Feb 2019 06:00              Microbiology     RADIOLOGY & ADDITIONAL TESTS:    Imaging Personally Reviewed:    Consultant(s) Notes Reviewed:      Care Discussed with Consultants/Other Providers:

## 2019-02-07 NOTE — DISCHARGE NOTE ADULT - PLAN OF CARE
Pain control Empty washington bag as needed as instructed.  Drink plenty of fluids.  No heavy lifting or straining for 4 to 6 weeks, avoid constipation. You may have intermittent pink tinged urine and small amounts of leakage around washington (due to bladder spasms).  This is normal.   If your urine becomes bright red or with clots, or there is no urine in the bag, please call the office. You may shower, just pat white strips dry, they will fall off in a few weeks. Change dressing at drain site daily or as needed until dry.  Call Dr. Stanford's to schedule a follow up appointment next week for washington removal and further management.  Call the office if you have fever greater than 101, no urine in bag, pain not relieved with pain medication, nausea/vomiting.

## 2019-02-07 NOTE — DISCHARGE NOTE ADULT - CARE PLAN
Principal Discharge DX:	Prostate CA  Goal:	Pain control  Assessment and plan of treatment:	Empty washington bag as needed as instructed.  Drink plenty of fluids.  No heavy lifting or straining for 4 to 6 weeks, avoid constipation. You may have intermittent pink tinged urine and small amounts of leakage around washington (due to bladder spasms).  This is normal.   If your urine becomes bright red or with clots, or there is no urine in the bag, please call the office. You may shower, just pat white strips dry, they will fall off in a few weeks. Change dressing at drain site daily or as needed until dry.  Call Dr. Stanford's to schedule a follow up appointment next week for washington removal and further management.  Call the office if you have fever greater than 101, no urine in bag, pain not relieved with pain medication, nausea/vomiting.

## 2019-02-07 NOTE — DISCHARGE NOTE ADULT - NS AS ACTIVITY OBS
Stairs allowed/Walking-Outdoors allowed/Walking-Indoors allowed/No Heavy lifting/straining/Showering allowed

## 2019-02-07 NOTE — PROGRESS NOTE ADULT - ASSESSMENT
64 yo M POD #1 RALP/LND    -AM labs reviewed  -IVM  -Continue CLD, monitor GI function  -IVM  -Continue washington  -Washington/leg bag teaching  -DVT prophy, IS  -OOB, ambulate
63 year old male s/p RALP and bilateral lymph node dissection, stable

## 2019-02-07 NOTE — DISCHARGE NOTE ADULT - CARE PROVIDER_API CALL
Erik Stanford)  Urology  [address] [place] [address], [place] [address]  Phone: [phone]  Fax: [phone]  Follow Up Time:

## 2019-02-07 NOTE — PROGRESS NOTE ADULT - SUBJECTIVE AND OBJECTIVE BOX
ANESTHESIA POSTOP CHECK    63y Male POSTOP DAY 1 S/P     Vital Signs Last 24 Hrs  T(C): 36.3 (07 Feb 2019 09:22), Max: 36.8 (06 Feb 2019 18:00)  T(F): 97.4 (07 Feb 2019 09:22), Max: 98.2 (06 Feb 2019 18:00)  HR: 72 (07 Feb 2019 09:22) (55 - 88)  BP: 120/68 (07 Feb 2019 09:22) (104/51 - 130/72)  BP(mean): 64 (06 Feb 2019 18:00) (64 - 80)  RR: 16 (07 Feb 2019 09:22) (8 - 17)  SpO2: 99% (07 Feb 2019 09:22) (99% - 100%)  I&O's Summary    06 Feb 2019 07:01  -  07 Feb 2019 07:00  --------------------------------------------------------  IN: 405 mL / OUT: 1445 mL / NET: -1040 mL    07 Feb 2019 07:01  -  07 Feb 2019 12:45  --------------------------------------------------------  IN: 0 mL / OUT: 1075 mL / NET: -1075 mL        [x ] NO APPARENT ANESTHESIA COMPLICATIONS      Comments:

## 2019-02-07 NOTE — DISCHARGE NOTE ADULT - MEDICATION SUMMARY - MEDICATIONS TO TAKE
I will START or STAY ON the medications listed below when I get home from the hospital:    traMADol 50 mg oral tablet  -- 1/2 tablet every 4 hours as needed for moderate pain, 1 tablet every 6 hours as needed for severe pain MDD:3  -- Indication: For Moderate to Severe Pain    ibuprofen 600 mg oral tablet  -- 1 tab(s) by mouth every 6 hours as needed for pain, take with food  -- Do not take this drug if you are pregnant.  It is very important that you take or use this exactly as directed.  Do not skip doses or discontinue unless directed by your doctor.  May cause drowsiness or dizziness.  Obtain medical advice before taking any non-prescription drugs as some may affect the action of this medication.  Take with food or milk.    -- Indication: For Pain    aspirin 81 mg oral tablet  -- 1 tab(s) by mouth once a day - last dose 12/4/18  -- Indication: For Home med    lidocaine 5% topical ointment  -- Apply to tip of penis every 3 to 4 hours as needed for catheter irritation  -- Indication: For Catheter irritation    hydrocortisone 1% topical ointment  -- Apply to tip of penis every 3 to 4 hours, As needed, for catheter irritation  -- Indication: For Catheter irritation    senna oral tablet  -- 2 tab(s) by mouth once a day (at bedtime)  -- Indication: For Constipation    docusate sodium 100 mg oral capsule  -- 1 cap(s) by mouth 3 times a day  -- Indication: For Constipation

## 2019-02-07 NOTE — DISCHARGE NOTE ADULT - HOSPITAL COURSE
64 yo M underwent RALP/LND on .  Postoperative course uneventful, pain controlled, urine remained acceptable in color, ambulating.  Return of GI function on POD #1, diet advanced without incident.  BERTO Cr < serum, BERTO d/c and pt d/c with washington to leg bag to f/u with Dr. Stanford.  I-stop checked. 64 yo M underwent RALP/LND on .  Postoperative course uneventful, pain controlled, urine remained acceptable in color, ambulating.  Return of GI function on POD #1, diet advanced without incident.  BETRO Cr = serum, BERTO d/c and pt d/c with washington to leg bag to f/u with Dr. Stanford.  I-stop checked.

## 2019-02-07 NOTE — DISCHARGE NOTE ADULT - MEDICATION SUMMARY - MEDICATIONS TO STOP TAKING
I will STOP taking the medications listed below when I get home from the hospital:    Fish Oil oral capsule  -- 1 cap(s) by mouth once a day - last dose 12/4/18

## 2019-02-07 NOTE — PROGRESS NOTE ADULT - SUBJECTIVE AND OBJECTIVE BOX
Overnight events:  None    Subjective:  Pt offers no complaints, tolerating CLD, no N/V, no flatus yet, was OOB      T(F): 97.8, Max: 98.2 (02-06-19 @ 18:00)  HR: 73  BP: 112/67  SpO2: 99%    OUT:    Bulb: 130 mL serosang    Indwelling Catheter - Urethral: 1150 mL yellow          Gen: NAD  Abd: steris c/d/i, soft, nontender, nondistended  : washington secured, yellow urine      02-07 @ 06:00    WBC 9.74  / Hct 38.6  / SCr 1.02

## 2019-02-08 PROBLEM — R97.20 ELEVATED PROSTATE SPECIFIC ANTIGEN [PSA]: Chronic | Status: ACTIVE | Noted: 2019-01-24

## 2019-02-08 PROBLEM — R06.83 SNORING: Chronic | Status: ACTIVE | Noted: 2019-01-24

## 2019-02-08 PROBLEM — N41.9 INFLAMMATORY DISEASE OF PROSTATE, UNSPECIFIED: Chronic | Status: ACTIVE | Noted: 2019-01-24

## 2019-02-14 ENCOUNTER — APPOINTMENT (OUTPATIENT)
Dept: UROLOGY | Facility: CLINIC | Age: 64
End: 2019-02-14
Payer: COMMERCIAL

## 2019-02-14 VITALS
TEMPERATURE: 97.4 F | DIASTOLIC BLOOD PRESSURE: 65 MMHG | HEART RATE: 84 BPM | RESPIRATION RATE: 16 BRPM | SYSTOLIC BLOOD PRESSURE: 133 MMHG

## 2019-02-14 PROCEDURE — 99024 POSTOP FOLLOW-UP VISIT: CPT

## 2019-02-20 LAB — SURGICAL PATHOLOGY STUDY: SIGNIFICANT CHANGE UP

## 2019-03-24 ENCOUNTER — TRANSCRIPTION ENCOUNTER (OUTPATIENT)
Age: 64
End: 2019-03-24

## 2019-03-28 ENCOUNTER — APPOINTMENT (OUTPATIENT)
Dept: UROLOGY | Facility: CLINIC | Age: 64
End: 2019-03-28
Payer: COMMERCIAL

## 2019-03-28 DIAGNOSIS — Z87.898 PERSONAL HISTORY OF OTHER SPECIFIED CONDITIONS: ICD-10-CM

## 2019-03-28 PROCEDURE — 99024 POSTOP FOLLOW-UP VISIT: CPT

## 2019-03-29 PROBLEM — Z87.898 HISTORY OF ELEVATED PROSTATE SPECIFIC ANTIGEN (PSA): Status: RESOLVED | Noted: 2018-06-27 | Resolved: 2019-03-29

## 2019-03-29 LAB — PSA SERPL-MCNC: <0.01 NG/ML

## 2019-03-29 NOTE — LETTER BODY
[FreeTextEntry1] : Manohar Velazquez MD\par [address]\[place] [address] \par \par Dear Dr. Velazquez,\par \par Jose Saucedo returns to the office today. He is a 63-year-old man with history of prostate cancer recently status post radical prostatectomy about 7 weeks ago. He is back today for continued postoperative assessment. He reports that he is feeling quite well. He has started to gain good control of his urination and he sees the incontinence is resolving. He has started pelvic floor physical therapy which I had recommended to him to help expedite his recovery. He says that he is dry overnight and he has fairly mild leakage during the course of the day. He is now changing about 1-2 pads per day. He reports a good urinary flow without hesitancy or sensation of incomplete bladder emptying. He has not yet had any erections after the surgery but has also not tried to induce any erections.\par \par His appetite has returned to normal. He is back to work. He reports normal bowel movements and no residual abdominal pain. There is some itching present overlying some of the areas of the incisions. Exam shows a mild erythema which appears to be consistent with a possible adhesive related rash. The patient has been using some tape over some of the incision sites reportedly. I recommended no use of adhesives and topical hydrocortisone to help relieve the itching.\par \par I collected a PSA level today which is undetectable at this time. His surgical pathology had shown Sturdivant 4+3 disease which was organ confined. [id number]. \par \par Based on these findings I would suggest that he did not harbor any evidence of residual disease at this time and that he could be observed. I will plan to continue PSA evaluation for him about every 3-4 months for this coming year. I do not anticipate any need for additional treatment.\par \par He will continue his pelvic floor physical therapy. We also discussed penile rehabilitation today in the office. He does not wish to use any medication at this time but will try to self stimulate to assist with penile rehabilitation.\par \par Please do not hesitate to contact me with any questions or concerns.\par \par Sincerely,\par \par \par \par \par Erik Stanford MD, FACS\par  of Urology\par [facility]\par \par [facility]\par Director of Robotics and Minimally Invasive Surgery\par [address] [address]\[place] [address]\par P: [phone]\par F: [phone]\par [email or web address]\par \par \par \par cc:\par \par Angelika Saha M.D.\par Director of Urology\par [facility]\par [address], [address]\[place] [address] \par \par

## 2019-05-08 ENCOUNTER — TRANSCRIPTION ENCOUNTER (OUTPATIENT)
Age: 64
End: 2019-05-08

## 2019-05-11 ENCOUNTER — TRANSCRIPTION ENCOUNTER (OUTPATIENT)
Age: 64
End: 2019-05-11

## 2019-05-22 ENCOUNTER — TRANSCRIPTION ENCOUNTER (OUTPATIENT)
Age: 64
End: 2019-05-22

## 2019-05-29 ENCOUNTER — TRANSCRIPTION ENCOUNTER (OUTPATIENT)
Age: 64
End: 2019-05-29

## 2019-06-20 ENCOUNTER — APPOINTMENT (OUTPATIENT)
Dept: UROLOGY | Facility: CLINIC | Age: 64
End: 2019-06-20
Payer: MEDICARE

## 2019-06-20 DIAGNOSIS — N39.3 STRESS INCONTINENCE (FEMALE) (MALE): ICD-10-CM

## 2019-06-20 PROCEDURE — 99214 OFFICE O/P EST MOD 30 MIN: CPT

## 2019-06-23 LAB — PSA SERPL-MCNC: <0.01 NG/ML

## 2019-07-23 NOTE — H&P PST ADULT - PRO PAIN LIFE ADAPT
Try to quit smoking.    Aleve 2 tabs twice a day for 1-2 weeks.   Tizanidine 4mg at night at least but can take 3 times a day.   Do home exercises twice a day.    Heat/ice is good three times a day.    
decreased activity level

## 2019-07-25 ENCOUNTER — TRANSCRIPTION ENCOUNTER (OUTPATIENT)
Age: 64
End: 2019-07-25

## 2019-10-10 ENCOUNTER — APPOINTMENT (OUTPATIENT)
Dept: UROLOGY | Facility: CLINIC | Age: 64
End: 2019-10-10
Payer: MEDICARE

## 2019-10-10 VITALS — DIASTOLIC BLOOD PRESSURE: 76 MMHG | TEMPERATURE: 98.4 F | HEART RATE: 81 BPM | SYSTOLIC BLOOD PRESSURE: 123 MMHG

## 2019-10-10 DIAGNOSIS — N41.1 CHRONIC PROSTATITIS: ICD-10-CM

## 2019-10-10 PROCEDURE — 99213 OFFICE O/P EST LOW 20 MIN: CPT

## 2019-10-11 LAB — PSA SERPL-MCNC: <0.01 NG/ML

## 2020-02-13 ENCOUNTER — APPOINTMENT (OUTPATIENT)
Dept: UROLOGY | Facility: CLINIC | Age: 65
End: 2020-02-13
Payer: MEDICARE

## 2020-02-13 VITALS
HEIGHT: 70 IN | TEMPERATURE: 97.7 F | WEIGHT: 174 LBS | DIASTOLIC BLOOD PRESSURE: 78 MMHG | BODY MASS INDEX: 24.91 KG/M2 | HEART RATE: 76 BPM | SYSTOLIC BLOOD PRESSURE: 119 MMHG

## 2020-02-13 PROCEDURE — 99214 OFFICE O/P EST MOD 30 MIN: CPT

## 2020-02-14 LAB — PSA SERPL-MCNC: <0.01 NG/ML

## 2020-08-07 ENCOUNTER — APPOINTMENT (OUTPATIENT)
Dept: UROLOGY | Facility: CLINIC | Age: 65
End: 2020-08-07
Payer: MEDICARE

## 2020-08-07 PROCEDURE — 99212 OFFICE O/P EST SF 10 MIN: CPT | Mod: 95

## 2020-08-11 ENCOUNTER — APPOINTMENT (OUTPATIENT)
Dept: UROLOGY | Facility: CLINIC | Age: 65
End: 2020-08-11

## 2021-01-26 ENCOUNTER — APPOINTMENT (OUTPATIENT)
Dept: UROLOGY | Facility: CLINIC | Age: 66
End: 2021-01-26

## 2021-01-26 ENCOUNTER — APPOINTMENT (OUTPATIENT)
Dept: UROLOGY | Facility: CLINIC | Age: 66
End: 2021-01-26
Payer: MEDICARE

## 2021-01-26 DIAGNOSIS — N52.31 ERECTILE DYSFUNCTION FOLLOWING RADICAL PROSTATECTOMY: ICD-10-CM

## 2021-01-26 DIAGNOSIS — C61 MALIGNANT NEOPLASM OF PROSTATE: ICD-10-CM

## 2021-01-26 PROCEDURE — 99213 OFFICE O/P EST LOW 20 MIN: CPT | Mod: 95

## 2021-01-26 RX ORDER — SILDENAFIL 20 MG/1
20 TABLET ORAL
Qty: 30 | Refills: 11 | Status: DISCONTINUED | COMMUNITY
Start: 2019-05-08 | End: 2021-01-26

## 2021-01-26 NOTE — LETTER BODY
[FreeTextEntry1] : Manohar Velazquez MD\par [address]\[place] [address] \par \par Dear Dr. Velazquez,\par \par Jose Saucedo returns today for telemedicine follow-up.  He is a 65-year-old man with history of prostate cancer status post radical prostatectomy performed 2 years ago in early February 2019.  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  He had organ confined disease with negative surgical margins.  His PSA level has remained undetectable since surgery and he is now with me today for ongoing follow-up.  He has recently had his PSA level rechecked on January 21 which does remain undetectable at this time.  He has good urinary control and is not limited in any way with regard to physical activities.  He has had partial erections but not satisfactory for penetration.  His use of sildenafil produced headaches which were fairly significant and he decided not to use this.  He has been offered penile injection therapy previously but has declined. He and his wife are not sexually active at this time related to some recent family issues and he is not interested in pursuing any treatment for now.\par \par Is continued absence of PSA presence in the bloodstream indicates no evidence of disease at this time.  There is no current plan that he needs to consider any additional treatment for his prostate cancer outside of the surgery already performed 2 years ago.  Now that he is 2 years out from his surgery with an undetectable PSA, I do think it is safe to transition him to an annual follow-up pattern for this.\par \par I will plan to see him again in a year from now and may do that by telephone visit as he does have quite a distance to drive to get here. Please do not hesitate to contact me with any questions or concerns.\par \par Sincerely,\par \par \par \par \par Erik Stanford MD, FACS\par  of Urology\par Residency \par [facility]\par \par [facility]\par Director of Robotics and Minimally Invasive Surgery\par [address] [place] [address]\[place] [address]\par P: [phone]\par F: [phone]\par [facility]\par

## 2021-01-26 NOTE — HISTORY OF PRESENT ILLNESS
[Home] : at home, [unfilled] , at the time of the visit. [Medical Office: ([facility])___] : at the medical office located in  [Verbal consent obtained from patient] : the patient, [unfilled] [FreeTextEntry1] : See consult letter

## 2021-11-24 ENCOUNTER — TRANSCRIPTION ENCOUNTER (OUTPATIENT)
Age: 66
End: 2021-11-24

## 2022-02-04 NOTE — H&P PST ADULT - NEGATIVE NEUROLOGICAL SYMPTOMS
Yes - the patient is able to be screened no focal seizures/no headache/no generalized seizures/no cva

## 2023-01-17 NOTE — H&P PST ADULT - NEUROLOGICAL

## 2024-04-24 NOTE — PATIENT PROFILE ADULT - STATED REASON FOR ADMISSION
SILVESTRE Banda from [facility] is calling and states they have patient at their office right now and wondering if the dentist can go ahead and do a tissue graft on one of her teeth while being on Prolia injections. Please advise on recommendations.     Last injection was 2/15/24, every 6 months.    Radical prostatectomy